# Patient Record
Sex: MALE | Race: BLACK OR AFRICAN AMERICAN | NOT HISPANIC OR LATINO | Employment: FULL TIME | URBAN - METROPOLITAN AREA
[De-identification: names, ages, dates, MRNs, and addresses within clinical notes are randomized per-mention and may not be internally consistent; named-entity substitution may affect disease eponyms.]

---

## 2021-12-20 ENCOUNTER — HISTORICAL (OUTPATIENT)
Dept: ADMINISTRATIVE | Facility: HOSPITAL | Age: 41
End: 2021-12-20

## 2021-12-20 LAB — PSA SERPL-MCNC: 0.34 NG/ML

## 2022-01-07 ENCOUNTER — HISTORICAL (OUTPATIENT)
Dept: RADIOLOGY | Facility: HOSPITAL | Age: 42
End: 2022-01-07

## 2022-04-10 ENCOUNTER — HISTORICAL (OUTPATIENT)
Dept: ADMINISTRATIVE | Facility: HOSPITAL | Age: 42
End: 2022-04-10

## 2022-04-26 VITALS
HEIGHT: 72 IN | WEIGHT: 295.88 LBS | DIASTOLIC BLOOD PRESSURE: 98 MMHG | SYSTOLIC BLOOD PRESSURE: 136 MMHG | OXYGEN SATURATION: 100 % | BODY MASS INDEX: 40.08 KG/M2

## 2022-05-03 NOTE — HISTORICAL [facility] CERNER
This is a historical note converted from Nancy. Formatting and pictures may have been removed.  Please reference Nancy for original formatting and attached multimedia. Chief Complaint  Evaluation for hematuria  History of Present Illness  Pt referred for hematuria.? Reports vague hx of some type scrotal trauma years back and maybe?surgery for such.? +GH? Smoker previous.? No FH.? No LUTS.  Review of Systems  12 point ROS negative other than HPI  Physical Exam  Vitals & Measurements  T:?36.7? ?C (Oral)? HR:?74(Peripheral)? RR:?18? BP:?136/98? SpO2:?100%? HT:?182.00?cm? WT:?134.200?kg?  GEN: WNWD NAD  HEENT: NCAT  CV: RRR  RESP: unlabored  ABD: soft NT/ND  : NEMG  EXT: no C/C/E  NEURO: AAOx4 no focal deficits  Assessment/Plan  1.?Hematuria?R31.9  -BMP/PSA  -CTU  -RTC for cysto   Problem List/Past Medical History  Ongoing  Hematuria  Morbid obesity  Historical  No qualifying data  Medications  No active medications  Allergies  No Known Medication Allergies  Social History  Abuse/Neglect  No, 12/20/2021  Alcohol  Past, 12/20/2021  Employment/School  Student, 12/20/2021  Home/Environment  Lives with Alone. Living situation: Home/Independent., 12/20/2021    Never in , 12/20/2021  Nutrition/Health  Regular, Good, 12/20/2021  Sexual  Sexually active: Yes. Number of current partners 1. Sexual orientation: Straight or heterosexual. Gender Identity Identifies as male. No, 12/20/2021  Spiritual/Cultural  None, 12/20/2021  Substance Use  Never, 12/20/2021  Tobacco  Never (less than 100 in lifetime), N/A, 12/20/2021  Health Maintenance  Health Maintenance  ???Pending?(in the next year)  ??? ??OverDue  ??? ? ? ?Alcohol Misuse Screening due??01/02/21??and every 1??year(s)  ??? ??Due?  ??? ? ? ?ADL Screening due??12/20/21??and every 1??year(s)  ??? ? ? ?Tetanus Vaccine due??12/20/21??and every 10??year(s)  ??? ??Due In Future?  ??? ? ? ?Obesity Screening not due until??01/01/22??and every  1??year(s)  ???Satisfied?(in the past 1 year)  ??? ??Satisfied?  ??? ? ? ?Blood Pressure Screening on??12/20/21.??Satisfied by Danna Bynum RN  ??? ? ? ?Body Mass Index Check on??12/20/21.??Satisfied by Danna Bynum RN  ??? ? ? ?Depression Screening on??12/20/21.??Satisfied by Danna Bynum RN  ??? ? ? ?Influenza Vaccine on??12/20/21.??Satisfied by Danna Bynum RN  ??? ? ? ?Obesity Screening on??12/20/21.??Satisfied by Danna Bynum RN  ?

## 2023-05-01 ENCOUNTER — OFFICE VISIT (OUTPATIENT)
Dept: UROLOGY | Facility: CLINIC | Age: 43
End: 2023-05-01
Payer: MEDICAID

## 2023-05-01 VITALS
HEART RATE: 54 BPM | SYSTOLIC BLOOD PRESSURE: 156 MMHG | HEIGHT: 71 IN | OXYGEN SATURATION: 98 % | RESPIRATION RATE: 18 BRPM | BODY MASS INDEX: 40.83 KG/M2 | WEIGHT: 291.63 LBS | DIASTOLIC BLOOD PRESSURE: 93 MMHG

## 2023-05-01 DIAGNOSIS — N35.816 OTHER STRICTURE OF OVERLAPPING SITES OF URETHRA IN MALE: ICD-10-CM

## 2023-05-01 DIAGNOSIS — R91.1 PULMONARY NODULE, LEFT: ICD-10-CM

## 2023-05-01 DIAGNOSIS — R31.0 GROSS HEMATURIA: Primary | ICD-10-CM

## 2023-05-01 PROCEDURE — 1159F PR MEDICATION LIST DOCUMENTED IN MEDICAL RECORD: ICD-10-PCS | Mod: CPTII,,, | Performed by: UROLOGY

## 2023-05-01 PROCEDURE — 99214 OFFICE O/P EST MOD 30 MIN: CPT | Mod: S$PBB,,, | Performed by: UROLOGY

## 2023-05-01 PROCEDURE — 3077F PR MOST RECENT SYSTOLIC BLOOD PRESSURE >= 140 MM HG: ICD-10-PCS | Mod: CPTII,,, | Performed by: UROLOGY

## 2023-05-01 PROCEDURE — 3080F PR MOST RECENT DIASTOLIC BLOOD PRESSURE >= 90 MM HG: ICD-10-PCS | Mod: CPTII,,, | Performed by: UROLOGY

## 2023-05-01 PROCEDURE — 3008F PR BODY MASS INDEX (BMI) DOCUMENTED: ICD-10-PCS | Mod: CPTII,,, | Performed by: UROLOGY

## 2023-05-01 PROCEDURE — 1160F RVW MEDS BY RX/DR IN RCRD: CPT | Mod: CPTII,,, | Performed by: UROLOGY

## 2023-05-01 PROCEDURE — 3077F SYST BP >= 140 MM HG: CPT | Mod: CPTII,,, | Performed by: UROLOGY

## 2023-05-01 PROCEDURE — 3080F DIAST BP >= 90 MM HG: CPT | Mod: CPTII,,, | Performed by: UROLOGY

## 2023-05-01 PROCEDURE — 99214 PR OFFICE/OUTPT VISIT, EST, LEVL IV, 30-39 MIN: ICD-10-PCS | Mod: S$PBB,,, | Performed by: UROLOGY

## 2023-05-01 PROCEDURE — 1160F PR REVIEW ALL MEDS BY PRESCRIBER/CLIN PHARMACIST DOCUMENTED: ICD-10-PCS | Mod: CPTII,,, | Performed by: UROLOGY

## 2023-05-01 PROCEDURE — 1159F MED LIST DOCD IN RCRD: CPT | Mod: CPTII,,, | Performed by: UROLOGY

## 2023-05-01 PROCEDURE — 3008F BODY MASS INDEX DOCD: CPT | Mod: CPTII,,, | Performed by: UROLOGY

## 2023-05-01 PROCEDURE — 99214 OFFICE O/P EST MOD 30 MIN: CPT | Mod: PBBFAC | Performed by: UROLOGY

## 2023-05-01 NOTE — PROGRESS NOTES
Pt seen by Dr. Crocker. Orders for CT will be placed by provider. Pt will return to clinic for next available cysto. Pt education given both written and verbal.

## 2023-05-01 NOTE — LETTER
May 1, 2023      [facility]  [address] [address]  Phone: [phone]       Patient: Chas Corea   YOB: 1980  Date of Visit: 05/01/2023    To Whom It May Concern:    Chas Corea  was at [facility] on 05/01/2023. The patient may return to work on 05/02/2023. If you have any questions or concerns, or if I can be of further assistance, please do not hesitate to contact me.    Sincerely,    Eric Pena LPN

## 2023-05-01 NOTE — PROGRESS NOTES
CC:  Yearly    HPI:  Chas Corea is a 42 y.o. male seen for follow-up of gross hematuria.  He has a history of gross hematuria with a negative CT scan in January of 2022 a cystoscopy in April of 2022 showed a 16 [place] caliber bulbar urethral stricture which was passively dilated with the scope.  There were also some wide caliber pendulous urethral strictures.  He had an episode of gross hematuria about a month ago.  He states that blood just drips out after urinating.  This occurred two times in about a week timeframe.  He does have a smoking history of one pack per day for ten years but quit about 13 years ago.  His urinary stream is decreased in force and it is taking longer for him to empty the bladder.  A pulmonary nodule was seen on the CT scan a year ago.  The patient states he is not had any follow-up for this.    Imaging:  CT - 7 January 2022:  The urinary tract is negative.  There was a 5 mm nodule described as subsolid in the imaged anterior left lower lobe.      Data Review:  CT.  Cystoscopy.    ROS:  All systems reviewed and are negative except as documented in HPI and/or Assessment and Plan.     Patient Active Problem List:     There is no problem list on file for this patient.       Past Medical History:  Past Medical History:   Diagnosis Date    BPH (benign prostatic hyperplasia)     Hypertension     Microscopic hematuria         Past Surgical History:  History reviewed. No pertinent surgical history.     Family History:  History reviewed. No pertinent family history.     Social History:  Social History     Socioeconomic History    Marital status:    Tobacco Use    Smoking status: Never    Smokeless tobacco: Never        Allergies:  Review of patient's allergies indicates:  No Known Allergies     Objective:  Vitals:    05/01/23 0846   BP: (!) 156/93   Pulse: (!) 54   Resp: 18     General:  Well developed, well nourished adult male in no acute distress  Abdomen: Soft, nontender, no  masses  Extremities:  No clubbing, cyanosis, or edema  Neurologic:  Grossly intact  Musculoskeletal:  Normal tone  Penis:  Circumcised, no lesions  Scrotum:  No hydrocele  Testicles:  Nontender, no masses  Epididymis:  Normal without masses    Assessment:  1. Gross hematuria  - CT Abdomen With Without Contrast; Future    2. Pulmonary nodule, left  - CT Chest With Contrast; Future    3. Other stricture of overlapping sites of urethra in male     Plan:  With the recurrence of the gross hematuria we will repeat the workup with a CT scan of the abdomen and pelvis and a cystoscopy.  He needs further workup for the pulmonary nodule so a CT of the chest was ordered.  He may need referral to a pulmonologist.  We will evaluate the urethral stricture disease with the cystoscopy.    Follow-up:  After CT for a cystoscopy.

## 2023-05-03 ENCOUNTER — TELEPHONE (OUTPATIENT)
Dept: UROLOGY | Facility: CLINIC | Age: 43
End: 2023-05-03
Payer: MEDICAID

## 2023-05-03 NOTE — TELEPHONE ENCOUNTER
CT orders faxed to the number provided on [id number]    ----- Message from Justyna Denton sent at 5/2/2023  3:32 PM CDT -----  Regarding: CT Orders  Patient called requesting CT orders be faxed to   [facility]  Fax [phone]